# Patient Record
Sex: FEMALE | Race: WHITE | Employment: PART TIME | URBAN - METROPOLITAN AREA
[De-identification: names, ages, dates, MRNs, and addresses within clinical notes are randomized per-mention and may not be internally consistent; named-entity substitution may affect disease eponyms.]

---

## 2023-10-17 ENCOUNTER — APPOINTMENT (OUTPATIENT)
Dept: GENERAL RADIOLOGY | Age: 68
End: 2023-10-17
Payer: MEDICARE

## 2023-10-17 ENCOUNTER — HOSPITAL ENCOUNTER (EMERGENCY)
Age: 68
Discharge: HOME | End: 2023-10-17
Payer: MEDICARE

## 2023-10-17 VITALS
TEMPERATURE: 99.7 F | OXYGEN SATURATION: 97 % | DIASTOLIC BLOOD PRESSURE: 70 MMHG | RESPIRATION RATE: 17 BRPM | SYSTOLIC BLOOD PRESSURE: 126 MMHG | HEART RATE: 84 BPM

## 2023-10-17 DIAGNOSIS — U07.1 COVID: Primary | ICD-10-CM

## 2023-10-17 LAB
FLUAV RNA RESP QL NAA+PROBE: NOT DETECTED
FLUBV RNA RESP QL NAA+PROBE: NOT DETECTED
SARS-COV-2 RNA RESP QL NAA+PROBE: DETECTED
SOURCE: ABNORMAL
SPECIMEN DESCRIPTION: ABNORMAL

## 2023-10-17 PROCEDURE — 87636 SARSCOV2 & INF A&B AMP PRB: CPT

## 2023-10-17 PROCEDURE — 71046 X-RAY EXAM CHEST 2 VIEWS: CPT

## 2023-10-17 PROCEDURE — 6370000000 HC RX 637 (ALT 250 FOR IP): Performed by: NURSE PRACTITIONER

## 2023-10-17 PROCEDURE — 99284 EMERGENCY DEPT VISIT MOD MDM: CPT

## 2023-10-17 RX ORDER — CLONIDINE HYDROCHLORIDE 0.1 MG/1
0.1 TABLET ORAL 2 TIMES DAILY
COMMUNITY

## 2023-10-17 RX ORDER — MELOXICAM 7.5 MG/1
7.5 TABLET ORAL DAILY
COMMUNITY

## 2023-10-17 RX ORDER — GUAIFENESIN 200 MG/10ML
200 LIQUID ORAL 3 TIMES DAILY PRN
Qty: 118 ML | Refills: 0 | Status: SHIPPED | OUTPATIENT
Start: 2023-10-17

## 2023-10-17 RX ORDER — ATORVASTATIN CALCIUM 20 MG/1
20 TABLET, FILM COATED ORAL DAILY
COMMUNITY

## 2023-10-17 RX ORDER — PANTOPRAZOLE SODIUM 40 MG/1
40 FOR SUSPENSION ORAL
COMMUNITY

## 2023-10-17 RX ORDER — IBUPROFEN 600 MG/1
600 TABLET ORAL 3 TIMES DAILY PRN
Qty: 30 TABLET | Refills: 0 | Status: SHIPPED | OUTPATIENT
Start: 2023-10-17

## 2023-10-17 RX ORDER — METAXALONE 800 MG/1
800 TABLET ORAL 3 TIMES DAILY
COMMUNITY

## 2023-10-17 RX ORDER — ACETAMINOPHEN 500 MG
1000 TABLET ORAL ONCE
Status: COMPLETED | OUTPATIENT
Start: 2023-10-17 | End: 2023-10-17

## 2023-10-17 RX ORDER — ASCORBIC ACID 500 MG
500 TABLET ORAL DAILY
Qty: 30 TABLET | Refills: 0 | Status: SHIPPED | OUTPATIENT
Start: 2023-10-17

## 2023-10-17 RX ORDER — ZINC GLUCONATE 50 MG
50 TABLET ORAL DAILY
Qty: 30 TABLET | Refills: 0 | Status: SHIPPED | OUTPATIENT
Start: 2023-10-17

## 2023-10-17 RX ADMIN — ACETAMINOPHEN 1000 MG: 500 TABLET ORAL at 16:34

## 2023-10-17 ASSESSMENT — PAIN DESCRIPTION - DESCRIPTORS
DESCRIPTORS: ACHING;DISCOMFORT;SORE
DESCRIPTORS: ACHING;DISCOMFORT

## 2023-10-17 ASSESSMENT — PAIN - FUNCTIONAL ASSESSMENT: PAIN_FUNCTIONAL_ASSESSMENT: 0-10

## 2023-10-17 ASSESSMENT — PAIN DESCRIPTION - FREQUENCY: FREQUENCY: CONTINUOUS

## 2023-10-17 ASSESSMENT — PAIN DESCRIPTION - LOCATION
LOCATION: GENERALIZED
LOCATION: CHEST

## 2023-10-17 ASSESSMENT — PAIN DESCRIPTION - PAIN TYPE: TYPE: ACUTE PAIN

## 2023-10-17 ASSESSMENT — PAIN SCALES - GENERAL
PAINLEVEL_OUTOF10: 3
PAINLEVEL_OUTOF10: 6

## 2023-10-17 ASSESSMENT — PAIN DESCRIPTION - ONSET: ONSET: SUDDEN

## 2023-10-17 NOTE — ED NOTES
Patient ambulated around half of the department with a pulse ox of 97-98% and pulse of 102. Patient returned to assigned  room without difficulty.      Liliana Valdivia RN  10/17/23 [id number]

## 2023-10-18 NOTE — ED PROVIDER NOTES
chills. Patient states that she has some sinus congestion she denies any other symptoms at this time. Patient states that she has not tried or taken anything for her symptoms. Differential diagnosis: covid,influenza, uri  Patient at this time is non toxic in appearance. Patient instructed she was ambulated she had no tachypnea or hypoxia. Patient denies any chest pain or shortness of breath. We treated for COVID symptoms she is positive for COVID now. Patient's chest x-ray is negative for acute pneumonia. Patient states she going home to get further testing in several days. Patient placed medication for symptoms. Patient at this time is nontoxic in appearance and in no distress she is agreeable with close outpatient follow-up. Patient was also educated to return immediately to the nearest emergency department any symptoms worsen at any time. Patient is stable for discharge home. I am the Primary Clinician of Record.     FINAL IMPRESSION      1. COVID          DISPOSITION/PLAN     DISPOSITION Decision To Discharge 10/17/2023 06:51:37 PM      PATIENT REFERRED TO:  [facility] primary care  [phone]  Schedule an appointment as soon as possible for a visit in 2 days        DISCHARGE MEDICATIONS:  Discharge Medication List as of 10/17/2023  6:52 PM        START taking these medications    Details   guaiFENesin (ROBITUSSIN) 100 MG/5ML liquid Take 10 mLs by mouth 3 times daily as needed for Cough or Congestion, Disp-118 mL, R-0Normal      ibuprofen (ADVIL;MOTRIN) 600 MG tablet Take 1 tablet by mouth 3 times daily as needed for Pain, Disp-30 tablet, R-0Normal      zinc 50 MG TABS tablet Take 1 tablet by mouth daily, Disp-30 tablet, R-0Normal      vitamin C (ASCORBIC ACID) 500 MG tablet Take 1 tablet by mouth daily, Disp-30 tablet, R-0Normal             DISCONTINUED MEDICATIONS:  Discharge Medication List as of 10/17/2023  6:52 PM                 (Please note that portions of this note were completed